# Patient Record
Sex: FEMALE | Race: WHITE | NOT HISPANIC OR LATINO | Employment: FULL TIME | URBAN - METROPOLITAN AREA
[De-identification: names, ages, dates, MRNs, and addresses within clinical notes are randomized per-mention and may not be internally consistent; named-entity substitution may affect disease eponyms.]

---

## 2017-04-03 ENCOUNTER — VIRTUAL VISIT (OUTPATIENT)
Dept: FAMILY MEDICINE | Facility: OTHER | Age: 32
End: 2017-04-03

## 2017-04-03 NOTE — PROGRESS NOTES
"Date:   Clinician: Reza Avery  Clinician NPI: [id number]  Patient: Chhaya Jett  Patient : 1985  Patient Address: [address] [place] [address], [place] [address]  Patient Phone: [phone]  Visit Protocol: UTI  Patient Summary:  Chhaya is a 31 year old ( : 1985 ) female who initiated a Zip for a presumed bladder infection. When asked the question \"Do you have a [facility] primary care physician?\", Chhaya responded \"No\".    Her symptoms began yesterday and consist of urgency, dysuria, and urinary frequency.   Symptom Details   Urinary Frequency: Several times each hour    She denies nausea, loss of appetite, chills, fever, urinary incontinence, hesitation, vaginal discharge, abdominal pain, vomiting, recent antibiotic use, flank pain, hematuria, and foul smelling urine. Chhaya has never had kidney stones. She has not been hospitalized, been a patient in a nursing home, or had a catheter in the past two weeks. She denies risk factors for sexually transmitted infections.   Chhaya has had one (1) UTI in the past 12 months. Her most recent bladder infection was not within the last 4 weeks. Her current symptoms are similar to the previous UTI symptoms. She took ciprofloxacin for her last infection and found it to be effective.   Chhaya does not get yeast infections when she takes antibiotics.   She states she is not pregnant and denies breastfeeding. She no longer menstruates.   She does NOT smoke or use smokeless tobacco.  MEDICATIONS:  No current medications , ALLERGIES:  NKDA   Clinician Response:  Dear Chhaya,  Based on the information you have provided, you likely have a bladder infection, also called an acute urinary tract infection (UTI).   To treat your infection, I am prescribing:   Nitrofurantoin (Macrobid). Swallow one (1) tablet twice a day for 5 days. Take the tablet with food. Continue taking the tablets even if you feel better before all the medication " is gone. There is no refill with this prescription.   Antibiotic selections by the clinician are based on safety and effectiveness. You may or may not be prescribed the same medication that you took for your last bladder infection.   Some people develop allergies to antibiotics. If you notice a new rash, significant swelling, or difficulty breathing, stop the medication immediately and go into a clinic for physical evaluation.   To help treat your current UTI and prevent future occurrences, remember to:     Drink 8-10, 8-ounce glasses of water daily.    Urinate after sexual intercourse.    Wipe front to back after using the bathroom.     Some women may develop a yeast infection as a side effect of taking antibiotics. If you notice symptoms of a yeast infection, Zipnosis can help treat that condition as well. Simply log in and complete another Zip, which will cover all of the necessary questions to determine the best treatment for you.   You should visit a clinic for a follow-up visit if your symptoms do not improve in 1-2 days or if you experience another urinary tract infection soon after completing this treatment.  If you become pregnant during this course of treatment, stop taking the medication and contact your primary care clinician.   Diagnosis: Acute Uncomplicated Bladder Infection  Diagnosis ICD: N39.0  Prescription: nitrofurantoin (Macrobid) 100mg oral tablet 10 tablets, 5 days supply. Take one tablet by mouth two times a day for 5 days. Refills: 0, Refill as needed: no, Allow substitutions: yes  Prescription Sent At: April 03 18:56:22, 2017  Pharmacy: [id number] - [phone] - [address] [place] [address]

## 2018-02-17 ENCOUNTER — VIRTUAL VISIT (OUTPATIENT)
Dept: FAMILY MEDICINE | Facility: OTHER | Age: 33
End: 2018-02-17

## 2018-02-17 NOTE — PROGRESS NOTES
"Date:   Clinician: Nazia Arias  Clinician NPI: [id number]  Patient: Chhaya Jett  Patient : 1985  Patient Address: [address] [place] [address], [place] [address]  Patient Phone: [phone]  Visit Protocol: Oral mouth sores  Patient Summary:  Chhaya is a 32 year old ( : 1985 ) female who initiated a Visit for evaluation of oral mouth sores, specifically cold sores. When asked the question \"Please sign me up to receive news, health information and promotions. \", Chhaya responded \"No\".    Chhaya uploaded images of her condition.   She currently has lip sores. The current sores have been present since yesterday. She notes blisters and redness. The sores are recurrent and not spreading to other parts of the body. The sores are unilateral and are not grouped in a cluster of lesions. Her pain preceded the appearance of the sores. She has had 0 occurrences in the past three months.   Chhaya last had an outbreak over 2 months ago and has tried Valacyclovir (Valtrex) to treat the sores in the past. Valacyclovir (Valtrex) was effective in treating the sores.   She denies: pus, warmth around the affected skin area, exposure to sexually transmitted infection(s), feeling feverish, that the rash is spreading or having similar rash on other body parts. The rash is not located near the eyes.   The patient does not smoke or use smokeless tobacco.   She denies pregnancy and denies breastfeeding. She does not menstruate.   MEDICATIONS:  No current medications  , ALLERGIES:  NKDA   Clinician Response:  Dear Chhaya,  Based on the information provided, the lesions or sores you have are most likely cold sores, also known as fever blisters.  Unless you are allergic to the over-the-counter medication(s) below, I recommend using:   Abreva (docosanol topical). Apply up to five (5) times a day to help with your symptoms until the sores are healed. Start using this at first sign of " symptoms. Use for a maximum of 10 days. This is an over-the-counter medication that does not require a prescription.   I am prescribing:   Valacyclovir (Valtrex) 1gm tablets. Take two (2) tablets twice a day until the tablets are all gone. This medication comes with one refill. If you think your oral sores are returning, refill the medication and use it according to the instructions.   The technical term is 'herpes simplex virus type 1'. Common symptoms include a tingling sensation on the mouth or lips, fluid filled blisters, or crusting on the skin.   A person who has this condition can transmit the infection to others through direct contact. When you have active sores on your face, avoid direct contact such as kissing and do not share silverware, cups, or towels. After you have had cold sores, the virus remains dormant in your skin cells and can come back at a later time causing another cold sore.  To avoid spreading your sores to other people, or to other parts of your body, try not to itch or scratch the sores.  Scratching or itching the sores may also cause the development of a secondary infection. Be sure to wash your hands with soap and water after touching the sores.  If you become pregnant during this course of treatment with medication, stop taking the medication and contact your primary care provider.   If your sores do not heal within 2 weeks, please see your healthcare provider  for further evaluation.   Diagnosis: Herpes Simplex (Cold Sores)  Diagnosis ICD: B00.9  Prescription: valacyclovir (Valtrex) 1gm oral tablet 4 tablets, 1 days supply. Take two tablets by mouth every 12 hours until gone. Refills: 1, Refill as needed: no, Allow substitutions: yes  Pharmacy: [facility]/pharmacy #[id number] - [phone] - [address] [place] [address]

## 2021-07-09 ENCOUNTER — COMMUNICATION - HEALTHEAST (OUTPATIENT)
Dept: SCHEDULING | Facility: CLINIC | Age: 36
End: 2021-07-09

## 2021-07-22 ENCOUNTER — DOCUMENTATION ONLY (OUTPATIENT)
Dept: ADMINISTRATIVE | Facility: OTHER | Age: 36
End: 2021-07-22

## 2021-07-22 NOTE — PROGRESS NOTES
This encounter was created as part of manual pregnancy episode data conversion for the single EHR project. The following information (where applicable) was manually abstracted from the [email or web address] Epic instance on July 22, 2021: pregnancy episode name/date, dating, episode encounter linking, pregravid weight, number of fetuses, and pregnancy overview and plan.     Ana Patton RN   Clinical Informatics

## 2023-11-15 LAB
ABO (EXTERNAL): NORMAL
HEMOGLOBIN (EXTERNAL): 12.9 G/DL (ref 11.1–15.9)
HEPATITIS B SURFACE ANTIGEN (EXTERNAL): NONREACTIVE
HIV1+2 AB SERPL QL IA: NONREACTIVE
PLATELET COUNT (EXTERNAL): 274 10E3/UL (ref 150–450)
RH (EXTERNAL): NEGATIVE
RUBELLA ANTIBODY IGG (EXTERNAL): NORMAL
TREPONEMA PALLIDUM ANTIBODY (EXTERNAL): NONREACTIVE

## 2024-04-02 ENCOUNTER — TRANSFERRED RECORDS (OUTPATIENT)
Dept: HEALTH INFORMATION MANAGEMENT | Facility: CLINIC | Age: 39
End: 2024-04-02

## 2024-05-31 ENCOUNTER — LAB REQUISITION (OUTPATIENT)
Dept: LAB | Facility: CLINIC | Age: 39
End: 2024-05-31
Payer: COMMERCIAL

## 2024-05-31 DIAGNOSIS — Z36.85 ENCOUNTER FOR ANTENATAL SCREENING FOR STREPTOCOCCUS B: ICD-10-CM

## 2024-05-31 PROCEDURE — 87653 STREP B DNA AMP PROBE: CPT | Mod: ORL | Performed by: OBSTETRICS & GYNECOLOGY

## 2024-06-01 LAB — GP B STREP DNA SPEC QL NAA+PROBE: NEGATIVE

## 2024-06-24 ENCOUNTER — ANESTHESIA EVENT (OUTPATIENT)
Dept: OBGYN | Facility: HOSPITAL | Age: 39
End: 2024-06-24
Payer: COMMERCIAL

## 2024-06-24 ENCOUNTER — ANESTHESIA (OUTPATIENT)
Dept: OBGYN | Facility: HOSPITAL | Age: 39
End: 2024-06-24
Payer: COMMERCIAL

## 2024-06-24 ENCOUNTER — HOSPITAL ENCOUNTER (INPATIENT)
Facility: HOSPITAL | Age: 39
LOS: 2 days | Discharge: HOME | End: 2024-06-26
Attending: OBSTETRICS & GYNECOLOGY | Admitting: OBSTETRICS & GYNECOLOGY
Payer: COMMERCIAL

## 2024-06-24 DIAGNOSIS — O34.219 VBAC, DELIVERED: Primary | ICD-10-CM

## 2024-06-24 PROBLEM — Z34.90 PREGNANCY: Status: ACTIVE | Noted: 2024-06-24

## 2024-06-24 PROBLEM — Z36.89 ENCOUNTER FOR TRIAGE IN PREGNANT PATIENT: Status: ACTIVE | Noted: 2024-06-24

## 2024-06-24 LAB
ABO/RH(D): NORMAL
ABO/RH(D): NORMAL
ANTIBODY SCREEN, TUBE: NORMAL
FETAL BLEED SCREEN: NORMAL
HGB BLD-MCNC: 11.2 G/DL (ref 11.7–15.7)
SPECIMEN EXPIRATION DATE: NORMAL

## 2024-06-24 PROCEDURE — 0KQM0ZZ REPAIR PERINEUM MUSCLE, OPEN APPROACH: ICD-10-PCS | Performed by: OBSTETRICS & GYNECOLOGY

## 2024-06-24 PROCEDURE — 00HU33Z INSERTION OF INFUSION DEVICE INTO SPINAL CANAL, PERCUTANEOUS APPROACH: ICD-10-PCS | Performed by: ANESTHESIOLOGY

## 2024-06-24 PROCEDURE — 250N000009 HC RX 250: Performed by: OBSTETRICS & GYNECOLOGY

## 2024-06-24 PROCEDURE — 86850 RBC ANTIBODY SCREEN: CPT | Performed by: OBSTETRICS & GYNECOLOGY

## 2024-06-24 PROCEDURE — 722N000001 HC LABOR CARE VAGINAL DELIVERY SINGLE

## 2024-06-24 PROCEDURE — 85461 HEMOGLOBIN FETAL: CPT | Performed by: OBSTETRICS & GYNECOLOGY

## 2024-06-24 PROCEDURE — 36415 COLL VENOUS BLD VENIPUNCTURE: CPT | Performed by: OBSTETRICS & GYNECOLOGY

## 2024-06-24 PROCEDURE — 250N000011 HC RX IP 250 OP 636: Performed by: ANESTHESIOLOGY

## 2024-06-24 PROCEDURE — 85018 HEMOGLOBIN: CPT | Performed by: OBSTETRICS & GYNECOLOGY

## 2024-06-24 PROCEDURE — 3E0R3BZ INTRODUCTION OF ANESTHETIC AGENT INTO SPINAL CANAL, PERCUTANEOUS APPROACH: ICD-10-PCS | Performed by: ANESTHESIOLOGY

## 2024-06-24 PROCEDURE — 250N000013 HC RX MED GY IP 250 OP 250 PS 637: Performed by: OBSTETRICS & GYNECOLOGY

## 2024-06-24 PROCEDURE — 120N000001 HC R&B MED SURG/OB

## 2024-06-24 PROCEDURE — 86900 BLOOD TYPING SEROLOGIC ABO: CPT | Performed by: OBSTETRICS & GYNECOLOGY

## 2024-06-24 PROCEDURE — 370N000003 HC ANESTHESIA WARD SERVICE: Performed by: ANESTHESIOLOGY

## 2024-06-24 PROCEDURE — 250N000011 HC RX IP 250 OP 636: Mod: JZ | Performed by: OBSTETRICS & GYNECOLOGY

## 2024-06-24 PROCEDURE — 258N000003 HC RX IP 258 OP 636: Mod: JZ | Performed by: OBSTETRICS & GYNECOLOGY

## 2024-06-24 PROCEDURE — 10907ZC DRAINAGE OF AMNIOTIC FLUID, THERAPEUTIC FROM PRODUCTS OF CONCEPTION, VIA NATURAL OR ARTIFICIAL OPENING: ICD-10-PCS | Performed by: OBSTETRICS & GYNECOLOGY

## 2024-06-24 RX ORDER — IBUPROFEN 800 MG/1
800 TABLET, FILM COATED ORAL EVERY 6 HOURS PRN
Status: DISCONTINUED | OUTPATIENT
Start: 2024-06-24 | End: 2024-06-26 | Stop reason: HOSPADM

## 2024-06-24 RX ORDER — IBUPROFEN 800 MG/1
800 TABLET, FILM COATED ORAL
Status: DISCONTINUED | OUTPATIENT
Start: 2024-06-24 | End: 2024-06-26 | Stop reason: HOSPADM

## 2024-06-24 RX ORDER — MISOPROSTOL 200 UG/1
800 TABLET ORAL
Status: DISCONTINUED | OUTPATIENT
Start: 2024-06-24 | End: 2024-06-24 | Stop reason: HOSPADM

## 2024-06-24 RX ORDER — LOPERAMIDE HCL 2 MG
4 CAPSULE ORAL
Status: DISCONTINUED | OUTPATIENT
Start: 2024-06-24 | End: 2024-06-24 | Stop reason: HOSPADM

## 2024-06-24 RX ORDER — PROCHLORPERAZINE MALEATE 10 MG
10 TABLET ORAL EVERY 6 HOURS PRN
Status: DISCONTINUED | OUTPATIENT
Start: 2024-06-24 | End: 2024-06-24 | Stop reason: HOSPADM

## 2024-06-24 RX ORDER — OXYTOCIN 10 [USP'U]/ML
10 INJECTION, SOLUTION INTRAMUSCULAR; INTRAVENOUS
Status: DISCONTINUED | OUTPATIENT
Start: 2024-06-24 | End: 2024-06-24 | Stop reason: HOSPADM

## 2024-06-24 RX ORDER — NALOXONE HYDROCHLORIDE 0.4 MG/ML
0.4 INJECTION, SOLUTION INTRAMUSCULAR; INTRAVENOUS; SUBCUTANEOUS
Status: DISCONTINUED | OUTPATIENT
Start: 2024-06-24 | End: 2024-06-24 | Stop reason: HOSPADM

## 2024-06-24 RX ORDER — MISOPROSTOL 200 UG/1
800 TABLET ORAL
Status: DISCONTINUED | OUTPATIENT
Start: 2024-06-24 | End: 2024-06-26 | Stop reason: HOSPADM

## 2024-06-24 RX ORDER — ACETAMINOPHEN 325 MG/1
650 TABLET ORAL EVERY 4 HOURS PRN
Status: DISCONTINUED | OUTPATIENT
Start: 2024-06-24 | End: 2024-06-24 | Stop reason: HOSPADM

## 2024-06-24 RX ORDER — MODIFIED LANOLIN
OINTMENT (GRAM) TOPICAL
Status: DISCONTINUED | OUTPATIENT
Start: 2024-06-24 | End: 2024-06-26 | Stop reason: HOSPADM

## 2024-06-24 RX ORDER — METOCLOPRAMIDE 10 MG/1
10 TABLET ORAL EVERY 6 HOURS PRN
Status: DISCONTINUED | OUTPATIENT
Start: 2024-06-24 | End: 2024-06-24 | Stop reason: HOSPADM

## 2024-06-24 RX ORDER — PROCHLORPERAZINE 25 MG
25 SUPPOSITORY, RECTAL RECTAL EVERY 12 HOURS PRN
Status: DISCONTINUED | OUTPATIENT
Start: 2024-06-24 | End: 2024-06-24 | Stop reason: HOSPADM

## 2024-06-24 RX ORDER — LIDOCAINE 40 MG/G
CREAM TOPICAL
Status: DISCONTINUED | OUTPATIENT
Start: 2024-06-24 | End: 2024-06-24 | Stop reason: HOSPADM

## 2024-06-24 RX ORDER — OXYTOCIN/0.9 % SODIUM CHLORIDE 30/500 ML
100-340 PLASTIC BAG, INJECTION (ML) INTRAVENOUS CONTINUOUS PRN
Status: DISCONTINUED | OUTPATIENT
Start: 2024-06-24 | End: 2024-06-26 | Stop reason: HOSPADM

## 2024-06-24 RX ORDER — DOCUSATE SODIUM 100 MG/1
100 CAPSULE, LIQUID FILLED ORAL 2 TIMES DAILY
Status: DISCONTINUED | OUTPATIENT
Start: 2024-06-24 | End: 2024-06-26 | Stop reason: HOSPADM

## 2024-06-24 RX ORDER — ONDANSETRON 4 MG/1
4 TABLET, ORALLY DISINTEGRATING ORAL EVERY 6 HOURS PRN
Status: DISCONTINUED | OUTPATIENT
Start: 2024-06-24 | End: 2024-06-24 | Stop reason: HOSPADM

## 2024-06-24 RX ORDER — METHYLERGONOVINE MALEATE 0.2 MG/ML
200 INJECTION INTRAVENOUS
Status: DISCONTINUED | OUTPATIENT
Start: 2024-06-24 | End: 2024-06-26 | Stop reason: HOSPADM

## 2024-06-24 RX ORDER — ONDANSETRON 2 MG/ML
4 INJECTION INTRAMUSCULAR; INTRAVENOUS EVERY 6 HOURS PRN
Status: DISCONTINUED | OUTPATIENT
Start: 2024-06-24 | End: 2024-06-24 | Stop reason: HOSPADM

## 2024-06-24 RX ORDER — FENTANYL CITRATE-0.9 % NACL/PF 10 MCG/ML
100 PLASTIC BAG, INJECTION (ML) INTRAVENOUS EVERY 5 MIN PRN
Status: DISCONTINUED | OUTPATIENT
Start: 2024-06-24 | End: 2024-06-24 | Stop reason: HOSPADM

## 2024-06-24 RX ORDER — OXYTOCIN/0.9 % SODIUM CHLORIDE 30/500 ML
340 PLASTIC BAG, INJECTION (ML) INTRAVENOUS CONTINUOUS PRN
Status: DISCONTINUED | OUTPATIENT
Start: 2024-06-24 | End: 2024-06-24 | Stop reason: HOSPADM

## 2024-06-24 RX ORDER — KETOROLAC TROMETHAMINE 30 MG/ML
30 INJECTION, SOLUTION INTRAMUSCULAR; INTRAVENOUS
Status: DISCONTINUED | OUTPATIENT
Start: 2024-06-24 | End: 2024-06-26 | Stop reason: HOSPADM

## 2024-06-24 RX ORDER — OXYTOCIN 10 [USP'U]/ML
10 INJECTION, SOLUTION INTRAMUSCULAR; INTRAVENOUS
Status: DISCONTINUED | OUTPATIENT
Start: 2024-06-24 | End: 2024-06-26 | Stop reason: HOSPADM

## 2024-06-24 RX ORDER — MISOPROSTOL 200 UG/1
400 TABLET ORAL
Status: DISCONTINUED | OUTPATIENT
Start: 2024-06-24 | End: 2024-06-26 | Stop reason: HOSPADM

## 2024-06-24 RX ORDER — FENTANYL/ROPIVACAINE/NS/PF 2MCG/ML-.1
PLASTIC BAG, INJECTION (ML) EPIDURAL
Status: DISCONTINUED | OUTPATIENT
Start: 2024-06-24 | End: 2024-06-24 | Stop reason: HOSPADM

## 2024-06-24 RX ORDER — TRANEXAMIC ACID 10 MG/ML
1 INJECTION, SOLUTION INTRAVENOUS EVERY 30 MIN PRN
Status: DISCONTINUED | OUTPATIENT
Start: 2024-06-24 | End: 2024-06-24 | Stop reason: HOSPADM

## 2024-06-24 RX ORDER — HYDROCORTISONE 25 MG/G
CREAM TOPICAL 3 TIMES DAILY PRN
Status: DISCONTINUED | OUTPATIENT
Start: 2024-06-24 | End: 2024-06-26 | Stop reason: HOSPADM

## 2024-06-24 RX ORDER — FENTANYL CITRATE 50 UG/ML
50 INJECTION, SOLUTION INTRAMUSCULAR; INTRAVENOUS EVERY 30 MIN PRN
Status: DISCONTINUED | OUTPATIENT
Start: 2024-06-24 | End: 2024-06-24 | Stop reason: HOSPADM

## 2024-06-24 RX ORDER — NALOXONE HYDROCHLORIDE 0.4 MG/ML
0.2 INJECTION, SOLUTION INTRAMUSCULAR; INTRAVENOUS; SUBCUTANEOUS
Status: DISCONTINUED | OUTPATIENT
Start: 2024-06-24 | End: 2024-06-24 | Stop reason: HOSPADM

## 2024-06-24 RX ORDER — CARBOPROST TROMETHAMINE 250 UG/ML
250 INJECTION, SOLUTION INTRAMUSCULAR
Status: DISCONTINUED | OUTPATIENT
Start: 2024-06-24 | End: 2024-06-24 | Stop reason: HOSPADM

## 2024-06-24 RX ORDER — CARBOPROST TROMETHAMINE 250 UG/ML
250 INJECTION, SOLUTION INTRAMUSCULAR
Status: DISCONTINUED | OUTPATIENT
Start: 2024-06-24 | End: 2024-06-26 | Stop reason: HOSPADM

## 2024-06-24 RX ORDER — LOPERAMIDE HCL 2 MG
2 CAPSULE ORAL
Status: DISCONTINUED | OUTPATIENT
Start: 2024-06-24 | End: 2024-06-24 | Stop reason: HOSPADM

## 2024-06-24 RX ORDER — LOPERAMIDE HCL 2 MG
2 CAPSULE ORAL
Status: DISCONTINUED | OUTPATIENT
Start: 2024-06-24 | End: 2024-06-26 | Stop reason: HOSPADM

## 2024-06-24 RX ORDER — MISOPROSTOL 200 UG/1
400 TABLET ORAL
Status: DISCONTINUED | OUTPATIENT
Start: 2024-06-24 | End: 2024-06-24 | Stop reason: HOSPADM

## 2024-06-24 RX ORDER — OXYTOCIN/0.9 % SODIUM CHLORIDE 30/500 ML
340 PLASTIC BAG, INJECTION (ML) INTRAVENOUS CONTINUOUS PRN
Status: DISCONTINUED | OUTPATIENT
Start: 2024-06-24 | End: 2024-06-26 | Stop reason: HOSPADM

## 2024-06-24 RX ORDER — LOPERAMIDE HCL 2 MG
4 CAPSULE ORAL
Status: DISCONTINUED | OUTPATIENT
Start: 2024-06-24 | End: 2024-06-26 | Stop reason: HOSPADM

## 2024-06-24 RX ORDER — METOCLOPRAMIDE HYDROCHLORIDE 5 MG/ML
10 INJECTION INTRAMUSCULAR; INTRAVENOUS EVERY 6 HOURS PRN
Status: DISCONTINUED | OUTPATIENT
Start: 2024-06-24 | End: 2024-06-24 | Stop reason: HOSPADM

## 2024-06-24 RX ORDER — METHYLERGONOVINE MALEATE 0.2 MG/ML
200 INJECTION INTRAVENOUS
Status: DISCONTINUED | OUTPATIENT
Start: 2024-06-24 | End: 2024-06-24 | Stop reason: HOSPADM

## 2024-06-24 RX ORDER — NALBUPHINE HYDROCHLORIDE 20 MG/ML
2.5-5 INJECTION, SOLUTION INTRAMUSCULAR; INTRAVENOUS; SUBCUTANEOUS EVERY 6 HOURS PRN
Status: DISCONTINUED | OUTPATIENT
Start: 2024-06-24 | End: 2024-06-26 | Stop reason: HOSPADM

## 2024-06-24 RX ORDER — TRANEXAMIC ACID 10 MG/ML
1 INJECTION, SOLUTION INTRAVENOUS EVERY 30 MIN PRN
Status: DISCONTINUED | OUTPATIENT
Start: 2024-06-24 | End: 2024-06-26 | Stop reason: HOSPADM

## 2024-06-24 RX ORDER — CITRIC ACID/SODIUM CITRATE 334-500MG
30 SOLUTION, ORAL ORAL
Status: DISCONTINUED | OUTPATIENT
Start: 2024-06-24 | End: 2024-06-24 | Stop reason: HOSPADM

## 2024-06-24 RX ORDER — ACETAMINOPHEN 325 MG/1
650 TABLET ORAL EVERY 4 HOURS PRN
Status: DISCONTINUED | OUTPATIENT
Start: 2024-06-24 | End: 2024-06-26 | Stop reason: HOSPADM

## 2024-06-24 RX ORDER — SODIUM CHLORIDE, SODIUM LACTATE, POTASSIUM CHLORIDE, CALCIUM CHLORIDE 600; 310; 30; 20 MG/100ML; MG/100ML; MG/100ML; MG/100ML
INJECTION, SOLUTION INTRAVENOUS CONTINUOUS
Status: DISCONTINUED | OUTPATIENT
Start: 2024-06-24 | End: 2024-06-26 | Stop reason: HOSPADM

## 2024-06-24 RX ADMIN — SODIUM CHLORIDE, POTASSIUM CHLORIDE, SODIUM LACTATE AND CALCIUM CHLORIDE 1000 ML: 600; 310; 30; 20 INJECTION, SOLUTION INTRAVENOUS at 15:59

## 2024-06-24 RX ADMIN — Medication 340 ML/HR: at 19:47

## 2024-06-24 RX ADMIN — SODIUM CHLORIDE, POTASSIUM CHLORIDE, SODIUM LACTATE AND CALCIUM CHLORIDE: 600; 310; 30; 20 INJECTION, SOLUTION INTRAVENOUS at 17:01

## 2024-06-24 RX ADMIN — LIDOCAINE HYDROCHLORIDE 20 ML: 10 INJECTION, SOLUTION EPIDURAL; INFILTRATION; INTRACAUDAL; PERINEURAL at 19:53

## 2024-06-24 RX ADMIN — Medication: at 16:40

## 2024-06-24 RX ADMIN — DOCUSATE SODIUM 100 MG: 100 CAPSULE, LIQUID FILLED ORAL at 23:25

## 2024-06-24 RX ADMIN — HUMAN RHO(D) IMMUNE GLOBULIN 300 MCG: 1500 SOLUTION INTRAMUSCULAR; INTRAVENOUS at 23:20

## 2024-06-24 ASSESSMENT — ACTIVITIES OF DAILY LIVING (ADL)
ADLS_ACUITY_SCORE: 18
ADLS_ACUITY_SCORE: 35
ADLS_ACUITY_SCORE: 18
WEAR_GLASSES_OR_BLIND: NO
ADLS_ACUITY_SCORE: 18
ADLS_ACUITY_SCORE: 18

## 2024-06-24 NOTE — ANESTHESIA PREPROCEDURE EVALUATION
"Anesthesia Pre-Procedure Evaluation    Patient: Chhaya Jett   MRN: [id number] : 1985        Procedure :           History reviewed. No pertinent past medical history.   Past Surgical History:   Procedure Laterality Date     SECTION      wisdom teeth        No Known Allergies   Social History     Tobacco Use    Smoking status: Never    Smokeless tobacco: Never   Substance Use Topics    Alcohol use: Not Currently      Wt Readings from Last 1 Encounters:   24 82.6 kg (182 lb)        Anesthesia Evaluation   Pt has had prior anesthetic.         ROS/MED HX  ENT/Pulmonary:  - neg pulmonary ROS     Neurologic:  - neg neurologic ROS     Cardiovascular:  - neg cardiovascular ROS     METS/Exercise Tolerance:     Hematologic:  - neg hematologic  ROS     Musculoskeletal:  - neg musculoskeletal ROS     GI/Hepatic:  - neg GI/hepatic ROS     Renal/Genitourinary:  - neg Renal ROS     Endo:     (+)               Obesity,       Psychiatric/Substance Use:       Infectious Disease:  - neg infectious disease ROS     Malignancy:  - neg malignancy ROS     Other:      (+) Possibly pregnant, , ,         Physical Exam    Airway  airway exam normal           Respiratory Devices and Support         Dental       (+) Completely normal teeth      Cardiovascular   cardiovascular exam normal       Rhythm and rate: regular and normal     Pulmonary   pulmonary exam normal        breath sounds clear to auscultation           OUTSIDE LABS:  CBC:   Lab Results   Component Value Date    HGB 11.2 (L) 2024     BMP: No results found for: \"NA\", \"POTASSIUM\", \"CHLORIDE\", \"CO2\", \"BUN\", \"CR\", \"GLC\"  COAGS: No results found for: \"PTT\", \"INR\", \"FIBR\"  POC: No results found for: \"BGM\", \"HCG\", \"HCGS\"  HEPATIC: No results found for: \"ALBUMIN\", \"PROTTOTAL\", \"ALT\", \"AST\", \"GGT\", \"ALKPHOS\", \"BILITOTAL\", \"BILIDIRECT\", \"KENZIE\"  OTHER: No results found for: \"PH\", \"LACT\", \"A1C\", \"GALILEO\", \"PHOS\", \"MAG\", \"LIPASE\", \"AMYLASE\", \"TSH\", \"T4\", \"T3\", " "\"CRP\", \"SED\"    Anesthesia Plan    ASA Status:  2       Anesthesia Type: Epidural.              Consents    Anesthesia Plan(s) and associated risks, benefits, and realistic alternatives discussed. Questions answered and patient/representative(s) expressed understanding.     - Discussed: Risks, Benefits and Alternatives for BOTH SEDATION and the PROCEDURE were discussed     - Discussed with:  Patient, Spouse      - Extended Intubation/Ventilatory Support Discussed: No.           Postoperative Care    Pain management: IV analgesics.   PONV prophylaxis: Ondansetron (or other 5HT-3)     Comments:               Alejandro Aguirre MD    I have reviewed the pertinent notes and labs in the chart from the past 30 days and (re)examined the patient.  Any updates or changes from those notes are reflected in this note.                  "

## 2024-06-24 NOTE — PROGRESS NOTES
Was called by patients nurse to perform ultrasound to confirm fetal position.   I performed bedside ultrasound. The fetus is in vertex position.     Cale Kim MD

## 2024-06-24 NOTE — ANESTHESIA PROCEDURE NOTES
"Epidural catheter Procedure Note    Pre-Procedure   Staff -        Anesthesiologist:  Alejandro Aguirre MD       Performed By: anesthesiologist       Location: OB       Procedure Start/Stop Times: 6/24/2024 4:31 PM and 6/24/2024 4:39 PM       Pre-Anesthestic Checklist: patient identified, IV checked, risks and benefits discussed, informed consent, monitors and equipment checked, pre-op evaluation and at physician/surgeon's request  Timeout:       Correct Patient: Yes        Correct Procedure: Yes        Correct Site: Yes        Correct Position: Yes   Procedure Documentation  Procedure: epidural catheter       Patient Position: sitting       Patient Prep/Sterile Barriers: sterile gloves, mask, patient draped       Skin prep: Chloraprep       Local skin infiltrated with mL of 1% lidocaine.        Insertion Site: L1-2. (midline approach).       Technique: LORT air        Needle Type: MobileSuiteslibra       Needle Gauge: 18.        Needle Length (Inches): 3.5        Catheter: 20 G.          Catheter threaded easily.         7 cm epidural space.         Threaded 12 cm at skin.         # of attempts: 1 and  # of redirects:  0    Assessment/Narrative         Test dose of mL lidocaine 1.5% w/ 1:200,000 epinephrine at 16:47 CDT.         Test dose negative, 3 minutes after injection, for signs of intravascular, subdural, or intrathecal injection.       Insertion/Infusion Method: LORT air       Aspiration negative for Heme or CSF via Epidural Catheter.    Medication(s) Administered   Medication Administration Time: 6/24/2024 4:31 PM      FOR [facility] ONLY:   Pain Team Contact information: please page the Pain Team Via [employer]. Search \"Pain\". During daytime hours, please page the attending first. At night please page the resident first.      "

## 2024-06-24 NOTE — PROGRESS NOTES
Data: Patient presented to [facility]: 2024 10:30 AM.  Reason for maternal/fetal assessment is uterine contractions. Patient reports irregular uterine contractions since last evening. Patient denies leaking of vaginal fluid/rupture of membranes, vaginal bleeding, nausea, vomiting, headache, visual disturbances, epigastric or RUQ pain, significant edema. Patient reports fetal movement is normal. Patient is a 40w0d . Prenatal record reviewed. Pregnancy has been . Support persons are present and supportive.     Fetal HR baseline was 120, variability is moderate (amplitude range 6 to 25 bpm). Accelerations: increase 15 bpm above baseline lasting 15 seconds. Decelerations: absent. Uterine assessment is   during contractions and   at rest. Cervix 4 cm dilated and 80% effaced. Fetal station -2. Fetal presentation cephalic (by bedside u/s) per Leopolds, cervical exam, and bedside ultrasound . Membranes: intact.    Vital signs wnl. Patient reports  tightening in lower pelvis and occasional cramping  and is coping well at this time and often talking through contractions, reporting the intensity has changed to less than she was experiencing at home.    Action: Verbal consent for EFM. Triage assessment completed. Patient does not meet criteria for early labor discharge. Reviewed prenatal record. Physical assessment completed and vital signs obtained.    Response: Patient verbalized agreement with plan. Dr Preston updated regarding cat I EFM tracing, uterine activity, SVE, TOLAC history and birth preference for free movement, minimal vaginal exams, calm environment and low intervention at this time. Plan to update provider in another hour.

## 2024-06-24 NOTE — PROGRESS NOTES
Comfortable following labor epidural-unaware of contractions and talking through them at this time. SVE as noted at 1657. Straight cath performed at 1713 for 125 ml pale yellow clear urine. Marysol had last voided just prior to epidural placement. Reviewed falls risk and band applied. Encouraged rest as needed. Dr Preston updated regarding cat I EFM tracing, uterine contraction pattern, SVE and epidural placement completed. Discussed options for AROM augmentation later this evening.

## 2024-06-24 NOTE — PROGRESS NOTES
"Comfortable following epidural placement.     Baby currently is Cat 1, one earlier deceleration.     O:  /55 (BP Location: Right arm, Patient Position: Left side, Cuff Size: Adult Regular)   Temp 98.6  F (37  C) (Oral)   Resp 18   Ht 1.676 m (5' 6\")   Wt 82.6 kg (182 lb)   LMP 2023   SpO2 100%   Breastfeeding No   BMI 29.38 kg/m       7-8cm/BBOW    40yo  @ 40wks.  GBS neg.    Expect .    TOLAC.        Ellen Preston DO, FACOG  2024 5:51 PM     "

## 2024-06-24 NOTE — PROGRESS NOTES
Feeling more pelvic pressure since arrival. Still reports feeling relaxed and able to talk through contraction. Reviewed positions to encourage fetal engagement and create space. Dr Preston at bedside and reviewed EFM tracing/contraction pattern and discussed options for augmentation if later needed and Marysol is interested. Intrapartum orders received and labs drawn. Plan with provider and patient to allow regular diet now and change to clear liquids once actively laboring. Will place IV access as labor progresses. Marysol feels comfortable with plan of care at this time.

## 2024-06-24 NOTE — PROGRESS NOTES
Marysol reports she is coping well with contractions. Frequent position changes in labor and hydrating orally with water at bedside. Feeling tired after lunch and encouraged rest as needed. Assisted to flying cowgirl position on left side in bed with peanut ball, decreased environmental stimuli. Irregular uterine contractions every 2-4 minutes that vary in intensity between mild and moderate.

## 2024-06-24 NOTE — H&P
"OB HISTORY AND PHYSICAL UPDATE ADMISSION EXAM--- LATE ENTRY    Chhaya Jett  1985  [id number]    HPI: 38yo  @ 40wks.  First baby was PLTCS at 26wks sec to abruption.     Uncomplicated preg.  Seeirene Nieves.      Estimated Date of Delivery: 2024                       EGA 40w0d    OB History    Para Term  AB Living   2 1 0 1 0 1   SAB IAB Ectopic Multiple Live Births   0 0 0 0 1      # Outcome Date GA Lbr Kirk/2nd Weight Sex Type Anes PTL Lv   2 Current            1  21 26w4d   F CS-LTranv  Y GLORIA      Complications: Abruptio Placenta     History reviewed. No pertinent past medical history.  Past Surgical History:   Procedure Laterality Date     SECTION      wisdom teeth         Exam:    /55 (BP Location: Right arm, Patient Position: Left side, Cuff Size: Adult Regular)   Temp 98.6  F (37  C) (Oral)   Resp 18   Ht 1.676 m (5' 6\")   Wt 82.6 kg (182 lb)   LMP 2023   SpO2 100%   Breastfeeding No   BMI 29.38 kg/m          HEENT          WNL              Heart              WNL               Lungs             WNL                      Abdomen        WNL                       Extremities     WNL                     Vaginal exam 4cm      Fetal Status  Cat 1    Assessment: Labor.  Prior c/s.      Plan: Spontaneous labor, anticipate vaginal delivery    Ellen Preston DO, FACOG  2024 5:52 PM     "

## 2024-06-25 LAB — HGB BLD-MCNC: 10.5 G/DL (ref 11.7–15.7)

## 2024-06-25 PROCEDURE — 85018 HEMOGLOBIN: CPT | Performed by: OBSTETRICS & GYNECOLOGY

## 2024-06-25 PROCEDURE — 250N000013 HC RX MED GY IP 250 OP 250 PS 637: Performed by: OBSTETRICS & GYNECOLOGY

## 2024-06-25 PROCEDURE — 120N000001 HC R&B MED SURG/OB

## 2024-06-25 PROCEDURE — 36415 COLL VENOUS BLD VENIPUNCTURE: CPT | Performed by: OBSTETRICS & GYNECOLOGY

## 2024-06-25 RX ADMIN — ACETAMINOPHEN 650 MG: 325 TABLET ORAL at 16:02

## 2024-06-25 RX ADMIN — IBUPROFEN 800 MG: 800 TABLET ORAL at 08:00

## 2024-06-25 RX ADMIN — DOCUSATE SODIUM 100 MG: 100 CAPSULE, LIQUID FILLED ORAL at 20:07

## 2024-06-25 RX ADMIN — WITCH HAZEL: 500 SOLUTION RECTAL; TOPICAL at 20:23

## 2024-06-25 RX ADMIN — BENZOCAINE AND LEVOMENTHOL: 200; 5 SPRAY TOPICAL at 03:50

## 2024-06-25 RX ADMIN — IBUPROFEN 800 MG: 800 TABLET ORAL at 20:06

## 2024-06-25 RX ADMIN — IBUPROFEN 800 MG: 800 TABLET ORAL at 14:15

## 2024-06-25 RX ADMIN — DOCUSATE SODIUM 100 MG: 100 CAPSULE, LIQUID FILLED ORAL at 08:00

## 2024-06-25 RX ADMIN — ACETAMINOPHEN 650 MG: 325 TABLET ORAL at 20:07

## 2024-06-25 ASSESSMENT — ACTIVITIES OF DAILY LIVING (ADL)
ADLS_ACUITY_SCORE: 18

## 2024-06-25 NOTE — LACTATION NOTE
This note was copied from a baby's chart.  Initial Lactation Visit    Hours since Delivery: 14 hours old    Gestational Age at Delivery: 40w0d     Diaper Count: 0 wet 1 soiled     Feedings: 5 breast 0 bottle      Past breastfeeding experience:First child was born at 26GA in the NICU for 6months. She used a nipple shield and worked closely with lactation.     Maternal health risk that may affect breastfeeding:AMA    Breast Assessment:rounded, symmetrical. Nipples tiara, intact    Feeding Assessment: Mother had infant in cross cradle hold on left breast, swallows observed 4:1. Reviewed tilting head, chin into breast to lift off of infant's chest. Mother plans on getting breastpump from her clinic.     Feeding Plan:Breastfeeding Plan:     Offer breast every 2-3 hours.   Massage breast to encourage milk flow   Strive for a deep and comfortable latch  Positioning reminders:  line up baby's nose to nipple   baby's chin touching the breast below the areola  ear, shoulder, hip, nice straight line   chin off chest  your thumb lined up like baby's mustache, fingers under breast like a baby's beard  cheeks touching breast  Switch sides when swallows slow, baby pauses lengthen and compressions do not help    Education:   [x] Expected  feeding patterns in the first few days (pg. 38 of Your Guide to To Postpartum and Natalbany Care)/ the Second Night  [] Stages of milk production  [x] Benefits of hand expression of colostrum  [x] Early feeding cues     [x] Benefits of skin to skin  [x] Breastfeeding positions  [x] Tips to get and maintain a deep latch  [x] Nutritive vs.non-nutritive sucking  [x] Gentle breast compressions as needed to enhance milk transfer  [x] How to tell when baby is finished  [x] Expected  output  [] [place] weight loss  [] Infant Feeding Log  [] Signs breastfeeding is going well (comfortable latch, audible swallows, age appropriate output and weight loss)    [] Engorgement  [] Reverse Pressure  Softening  [] Pumping recommendations (based on patient need)  [] Inpatient breastfeeding support  [] Outpatient lactation resources    Follow up: Will follow up tomorrow, 6/26, for discharge education

## 2024-06-25 NOTE — L&D DELIVERY NOTE
VAGINAL DELIVERY NOTE    PRE DELIVERY DIAGNOSIS  1) Intrauterine pregnancy at 40w0d   2) Prior  section  3) Meconium, thick  4) AMA      POST DELIVERY DIAGNOSIS  1) Intrauterine pregnancy at 40w0d   2) Delivery of a living male.  3) Weight:pending  4) Postpartum hemorrhage: No  5) 2nd Degree Laceration    Delivery Method/Type:       PROVIDER:   Ellen Preston DO, FACOG     ANESTHESIA:  Epidural    LACERATION: second degree    QBL: 150cc    COMPLICATIONS: None apparent    DESCRIPTION OF PROCEDURE  Chhaya Jett is a 39 year old  with prenatal care with Ezequiel who was admitted at 4cm for labor.  Patient had a .  Delayed cord clamping performed.  No gross fetal defects were observed. Pitocin was administered. Placenta delivered intact with 3 vessel cord. The perineum was then evaluated and noted to have a 2nd degree laceration that was repaired in the usual manner with 3-0 Vicryl.     Ellen Preston DO, FACOG          Jose Jett-Chhaya [[id number]]      Labor Event Times      Latent labor onset date/time: 2024 1430    Active labor onset date: 24 Onset time:  4:57 PM   Dilation complete date: 24 Complete time:  7:12 PM   Start pushing date/time: 2024 1915          Labor Events     labor?: No   steroids: None  Labor Type: Spontaneous, AROM  Predominate monitoring during 1st stage: continuous electronic fetal monitoring     Antibiotics received during labor?: No     Rupture identifier: Sac 1  Rupture date/time: 24 1841   Rupture type: Artificial Rupture of Membranes  Fluid color: Meconium  Fluid odor: Normal     Augmentation: AROM       Delivery/Placenta Date and Time      Delivery Date: 24 Delivery Time:  7:47 PM   Placenta Date/Time: 2024  7:59 PM  Delivering clinician: Ellen Preston MD   Other personnel present at delivery:  Provider Role   Stacy Mackey, RN Registered Nurse   Aminata Florez RN  Registered Nurse             Vaginal Counts       Initial count performed by 2 team members:  Two Team Members   Kary Mackey         Needles Suture Needles Sponges (RETIRED) Instruments   Initial counts 0 0 5    Added to count 1 2 0    Relief counts       Final counts 1 2 5            Placed during labor Accounted for at the end of labor   FSE Yes    IUPC NA    Cervidil NA NA                  Final count performed by 2 team members:  Two Team Members   Kary Mackey             Apgars    Living status: Living   1 Minute 5 Minute 10 Minute 15 Minute 20 Minute   Skin color: 0  1       Heart rate: 2  2       Reflex irritability: 2  2       Muscle tone: 2  2       Respiratory effort: 2  2       Total: 8  9       Apgars assigned by: DUSITN WOOD RN       Cord      Vessels: 3 Vessels    Cord Complications: Nuchal   Nuchal Intervention: reduced         Nuchal cord description: loose nuchal cord         Cord Blood Disposition: Lab    Gases Sent?: No    Delayed cord clamping?: Yes    Cord Clamping Delay (seconds): 31-60 seconds,  seconds    Stem cell collection?: No           [place] Resuscitation    Methods: None       Labor Events and Shoulder Dystocia    Fetal Tracing Prior to Delivery: Category 2, Category 1  Shoulder dystocia present?: Neg       Delivery (Maternal) (Provider to Complete) ([id number])    Episiotomy: None  Perineal lacerations: 2nd Repaired?: Yes   Est. blood loss (mL): 150  Repair suture: 3-0 Vicryl  Number of repair packets: 1  Genital tract inspection done: Pos       Blood Loss  Mother: Chhaya Jett #[id number]     Start of Mother's Information      Delivery Blood Loss  24 1657 - 24 2054      EBL (mL) Hospital Encounter 150 mL    Total  150 mL               End of Mother's Information  Mother: JohnieChhaya #[id number]                Delivery - Provider to Complete ([id number])    Delivering clinician: Ellen Preston MD  Delivery Type  (Choose the 1 that will go to the Birth History): , Spontaneous                         Other personnel:  Provider Role   Stacy Mackey, RN Registered Nurse   Aminata Florez RN Registered Nurse                    Placenta    Date/Time: 2024  7:59 PM  Removal: Spontaneous  Disposition: Hospital disposal             Anesthesia    Method: Epidural  Cervical dilation at placement: 4-7                    Presentation and Position    Presentation: Vertex

## 2024-06-25 NOTE — ANESTHESIA POSTPROCEDURE EVALUATION
Patient: Chhaya Jett    Procedure: * No procedures listed *       Anesthesia Type:  Epidural    Note:  Disposition: Inpatient   Postop Pain Control: Uneventful            Sign Out: Well controlled pain   PONV: No   Neuro/Psych: Uneventful            Sign Out: Acceptable/Baseline neuro status   Airway/Respiratory: Uneventful            Sign Out: Acceptable/Baseline resp. status   CV/Hemodynamics: Uneventful            Sign Out: Acceptable CV status; No obvious hypovolemia; No obvious fluid overload   Other NRE:    DID A NON-ROUTINE EVENT OCCUR?        Last vitals:  Vitals:    06/25/24 0340 06/25/24 0756 06/25/24 1200   BP: 105/65 128/78 128/76   Pulse:  76    Resp: 16 18 16   Temp: 36.9  C (98.5  F) 36.7  C (98.1  F) 36.4  C (97.5  F)   SpO2: 97% 98% 99%       Electronically Signed By: Andrew Foley MD  June 25, 2024  3:23 PM

## 2024-06-25 NOTE — PLAN OF CARE
Goal Outcome Evaluation:       BABY PCP AFTER DISCHARGE IS DR. NOEL BONNER @ [facility]. EM STEPHAN [facility] @1945 6/24/24

## 2024-06-25 NOTE — PROGRESS NOTES
of viable infant male at . Infant placed skin to skin with mother, spontaneous cry and breathing noted, delayed cord clamping completed. Cord cut and clamped, cord blood obtained due to maternal Rh negative status, cord segment collected per protocol. Placenta delivered spontaneously and intact at , bleeding well controlled, IV pitocin infusing per protocol and provider orders. 2nd degree laceration noted and repaired. Total QBL: 150, counts completed and correct. RN to continue closely monitoring and complete recovery cares.     Stacy Mackey RN

## 2024-06-25 NOTE — PROGRESS NOTES
Recovery cares completed, VSS, afebrile. Patient continues to report minimal pain and denies need for medication at this time. Pt successfully ambulated to  with ease, able to void 500 ccs, janice care completed independently, janice pad and ice pack applied. Face washed and teeth brushed. Patient overall doing remarkably well, bonding appropriately with infant and verbalizes relief and happiness. Patient transferred to PP room without complaint, able to void a 2nd time for 600 ccs, continues to report minimal pain. IV pitocin completed and IV saline locked. Report given to BALTA Capps RN, pt care relinquished at this time.     Stacy Mackey RN

## 2024-06-25 NOTE — PLAN OF CARE
Vitals and assessment within normal parameters.   Patient is up ambulating well independently and voiding without difficulty. Patient has completed two measured voids per protocol.     Patient denies pain and denies need for pain medication at this time. She is aware what is available for her for pain management. Ice pack and janice bottle helpful, per patient. Tucks and Dermoplast spray also provided.     Patient is bonding well with her . Her significant other is also present in the room and attentive.     Plan for hgb re-check in the am.       Sharda Capps RN      Problem: Adult Inpatient Plan of Care  Goal: Plan of Care Review  Description: The Plan of Care Review/Shift note should be completed every shift.  The Outcome Evaluation is a brief statement about your assessment that the patient is improving, declining, or no change.  This information will be displayed automatically on your shift  note.  Outcome: Progressing

## 2024-06-25 NOTE — PROGRESS NOTES
[facility] Labor and Delivery Progress Note  PPD # 1     Chhaya Jett MRN# [id number]   Age: 39 year old YOB: 1985           Subjective:   Happy, has voided and taking po, baby in room           Objective:   Patient Vitals for the past 24 hrs:   BP Temp Temp src Pulse Resp SpO2 Height Weight BMI (Calculated) Oximeter Heart Rate   24 0756 128/78 98.1  F (36.7  C) Oral 76 18 98 % -- -- -- --   24 0340 105/65 98.5  F (36.9  C) Oral -- 16 97 % -- -- -- 64 bpm   24 0008 127/80 98.1  F (36.7  C) Oral -- 18 98 % -- -- -- 80 bpm   245 135/76 98.5  F (36.9  C) Oral -- 20 -- -- -- -- 73 bpm   245 (!) 143/75 -- -- -- -- -- -- -- -- 87 bpm   24 139/66 -- -- -- -- -- -- -- -- 72 bpm   24 128/57 -- -- -- -- -- -- -- -- 76 bpm   24 123/67 -- -- -- -- -- -- -- -- 82 bpm   24 119/58 -- -- -- -- -- -- -- -- 80 bpm   24 121/55 -- -- -- -- -- -- -- -- 87 bpm   24 136/67 -- -- -- -- -- -- -- -- 77 bpm   24 125/62 98.8  F (37.1  C) Oral -- -- 97 % -- -- -- 86 bpm   24 1904 -- -- -- -- -- 100 % -- -- -- 97 bpm   24 1850 130/66 -- -- -- -- -- -- -- -- 84 bpm   24 -- -- -- -- -- 100 % -- -- -- 82 bpm   24 182 118/64 -- -- -- 18 -- -- -- -- 78 bpm   24 175 -- 98.6  F (37  C) Oral -- 16 100 % -- -- -- 73 bpm   24 1743 -- -- -- -- -- 100 % -- -- -- 70 bpm   24 113/55 -- -- -- 18 -- -- -- -- 71 bpm   24 -- -- -- -- -- 100 % -- -- -- 70 bpm   24 1723 110/55 -- -- -- 16 -- -- -- -- 66 bpm   24 -- -- -- -- -- 100 % -- -- -- 78 bpm   24 171 115/57 -- -- -- -- -- -- -- -- 81 bpm   24 -- -- -- -- -- 100 % -- -- -- 71 bpm   24 171 126/56 -- -- -- -- -- -- -- -- 87 bpm   24 -- -- -- -- -- 100 % -- -- -- 73 bpm   24 1707 113/60 -- -- -- -- -- -- -- -- 98 bpm   24 170 -- -- -- --  "-- 100 % -- -- -- 73 bpm   24 1703 111/65 -- -- -- -- -- -- -- -- 83 bpm   24 1701 -- -- -- -- -- 100 % -- -- -- 78 bpm   24 1657 115/68 -- -- -- 15 -- -- -- -- 83 bpm   24 1656 -- -- -- -- -- 100 % -- -- -- 79 bpm   24 1655 114/62 -- -- -- -- -- -- -- -- --   24 1645 114/65 98.6  F (37  C) Oral -- 16 99 % -- -- -- 75 bpm   24 1643 117/72 -- -- -- -- -- -- -- -- 78 bpm   24 1641 129/65 -- -- -- -- -- -- -- -- 75 bpm   24 1640 -- -- -- -- -- 100 % -- -- -- 78 bpm   24 1639 129/83 -- -- -- -- -- -- -- -- 81 bpm   24 1637 128/78 -- -- -- -- -- -- -- -- 81 bpm   24 1636 134/75 -- -- -- -- -- -- -- -- 86 bpm   24 1635 -- -- -- -- -- 99 % -- -- -- 90 bpm   24 1630 -- -- -- -- -- 100 % -- -- -- 89 bpm   24 1625 -- -- -- -- -- 100 % -- -- -- 80 bpm   24 1500 -- 98.4  F (36.9  C) Oral -- -- -- -- -- -- --   24 1422 120/74 -- -- -- 15 -- -- -- -- 80 bpm   24 1111 -- -- -- -- 16 -- -- -- -- --   24 1110 120/73 98  F (36.7  C) Oral -- -- -- -- -- -- 76 bpm   24 1107 -- -- -- -- -- -- 1.676 m (5' 6\") 82.6 kg (182 lb) 29.38 --       Alert, O x 3  CV regular  Resp non labored  Ext trace edema        Assessment:   Chhaya Jett is a 39 year old  who is PPD # 1 s/p successful , history of prior c/s at 26 weeks for abruption, doing well, 24 hours late pm          Plan:   Continue pp cares, home tomorrow      Radha Cheung MD      "

## 2024-06-25 NOTE — PROGRESS NOTES
Received bedside report from YAHIR Mackey RN. Dual bedside fundal check completed. RN/writer assuming care at this time.       Sharda Capps RN

## 2024-06-25 NOTE — PLAN OF CARE
Problem: Adult Inpatient Plan of Care  Goal: Plan of Care Review  Outcome: Progressing    VSS. Hgb 10.5 today. Fundus firm. Pt reports tolerable pain control with ibuprofen use. Pt declined prn tylenol when offered. Ambulating independently in the room. Breastfeeding with good latch observed. Attentive to infant cues.

## 2024-06-26 VITALS
SYSTOLIC BLOOD PRESSURE: 106 MMHG | TEMPERATURE: 97.8 F | RESPIRATION RATE: 16 BRPM | OXYGEN SATURATION: 99 % | HEART RATE: 70 BPM | WEIGHT: 177.1 LBS | HEIGHT: 66 IN | DIASTOLIC BLOOD PRESSURE: 73 MMHG | BODY MASS INDEX: 28.46 KG/M2

## 2024-06-26 PROCEDURE — 250N000013 HC RX MED GY IP 250 OP 250 PS 637: Performed by: OBSTETRICS & GYNECOLOGY

## 2024-06-26 RX ORDER — ACETAMINOPHEN 325 MG/1
650 TABLET ORAL EVERY 4 HOURS PRN
COMMUNITY
Start: 2024-06-26

## 2024-06-26 RX ORDER — IBUPROFEN 800 MG/1
800 TABLET, FILM COATED ORAL EVERY 6 HOURS PRN
COMMUNITY
Start: 2024-06-26

## 2024-06-26 RX ORDER — DOCUSATE SODIUM 100 MG/1
100 CAPSULE, LIQUID FILLED ORAL 2 TIMES DAILY PRN
COMMUNITY
Start: 2024-06-26

## 2024-06-26 RX ADMIN — BENZOCAINE AND LEVOMENTHOL: 200; 5 SPRAY TOPICAL at 11:08

## 2024-06-26 RX ADMIN — IBUPROFEN 800 MG: 800 TABLET ORAL at 08:45

## 2024-06-26 RX ADMIN — DOCUSATE SODIUM 100 MG: 100 CAPSULE, LIQUID FILLED ORAL at 08:45

## 2024-06-26 RX ADMIN — IBUPROFEN 800 MG: 800 TABLET ORAL at 01:48

## 2024-06-26 RX ADMIN — ACETAMINOPHEN 650 MG: 325 TABLET ORAL at 01:44

## 2024-06-26 ASSESSMENT — ACTIVITIES OF DAILY LIVING (ADL)
ADLS_ACUITY_SCORE: 18

## 2024-06-26 NOTE — LACTATION NOTE
This note was copied from a baby's chart.  Lactation follow-up Note:      Hours since Delivery: 1 day 13 hours old.    Gestational Age at Delivery: 40.0 weeks.    Visit with Lactation: In the last 24 hours, infant has been to breast 9 times, has voided x3, soiled x2, and had a weight loss of 4.11% (since delivery). Mother states Vinnie has been breastfeeding well, and she is wondering if things are going okay d/t this being her first full term infant. Vinnie are breast upon entering room, he was sleepy, and mother states he just ate for 35 minutes; LC slightly repositioned Vinnie to be closer to mother, and not pulling on her nipple as much. Reviewed what a deep latch looks and feels like, and well as nutritive sucking. Engorgement education given, as well as reverse pressure softening. Encouraged mother to let primary RN know if she would like lactation to return for feeding assistance or if questions arise. Mother aware of lactation resources available to her after discharging from hospital.     Plan: Skin-to-skin prior to feedings. Continue breastfeeding on-demand and/or every 2-3 hours. Track feedings and diaper output.    Has Breast Pump for Home: No, she states she will be getting Spectra S1 after discharge.    Education given: Stages of milk production after delivery, How to obtain & maintain a deep, comfortable latch, Listening & watching for swallows, How do I know if my baby is finished & getting enough, Importance of tracking all feedings and diaper output, Engorgement, Reverse pressure softening, Nutritive vs non-nutritive sucking, Burping, Cluster feeding, How to tell if breastfeeding is going well,  waking techniques, and Breast pump use for home.

## 2024-06-26 NOTE — PROGRESS NOTES
Birthplace RN Care Coordinator Note    Chhaya BARBARA Trejoer  [id number]  1985    Chart reviewed, discharge follow-up plan discussed with patient's bedside RN, needs assessed. Post-delivery appointment planned in 4-6 weeks at [facility]. Home care nurse visit not ordered by discharging provider; patient lives outside home care agency service area.     RN Care Coordinator will continue to follow and assist with discharge planning as needed.

## 2024-06-26 NOTE — PLAN OF CARE
Patient is bonding with baby boy, up ad boni, voiding without difficulty.  VSS, bleeding is scant, firm 1 below U. Utilizing tylenol, ibuprofen, cold packs, dermoplast and tucks for perineum soreness. Nipples tender, lanolin at bedside.   Breastfeeding every 2-3 hours, independent with latching and positioning. This RN offered help and assistance with feedings.     PPMA and BC at bedside, yet to complete. Family would like to do a circumcision outpatient.

## 2024-06-26 NOTE — PLAN OF CARE
Problem: Adult Inpatient Plan of Care  Goal: Plan of Care Review  Outcome: Met    VSS. Fundus firm. Pt reports tolerable pain relief with ibuprofen use. Ambulating independently in the room. Breastfeeding with good latch observed. Attentive to infant cues. Discharge instructions and warning signs given to pt and pt verbalized understanding and denied questions at this time. ID bands verified at discharge.

## 2024-06-26 NOTE — DISCHARGE INSTRUCTIONS
Warning Signs after Having a Baby    Keep this paper on your fridge or somewhere else where you can see it.    Call your provider if you have any of these symptoms up to 12 weeks after having your baby.    Thoughts of hurting yourself or your baby  Pain in your chest or trouble breathing  Severe headache not helped by pain medicine  Eyesight concerns (blurry vision, seeing spots or flashes of light, other changes to eyesight)  Fainting, shaking or other signs of a seizure    Call 9-1-1 if you feel that it is an emergency.     The symptoms below can happen to anyone after giving birth. They can be very serious. Call your provider if you have any of these warning signs.    My provider s phone number: _______________________    Losing too much blood (hemorrhage)    Call your provider if you soak through a pad in less than an hour or pass blood clots bigger than a golf ball. These may be signs that you are bleeding too much.    Blood clots in the legs or lungs    After you give birth, your body naturally clots its blood to help prevent blood loss. Sometimes this increased clotting can happen in other areas of the body, like the legs or lungs. This can block your blood flow and be very dangerous.     Call your provider if you:  Have a red, swollen spot on the back of your leg that is warm or painful when you touch it.   Are coughing up blood.     Infection    Call your provider if you have any of these symptoms:  Fever of 100.4 F (38 C) or higher.  Pain or redness around your stitches if you had an incision.   Any yellow, white, or green fluid coming from places where you had stitches or surgery.    Mood Problems (postpartum depression)    Many people feel sad or have mood changes after having a baby. But for some people, these mood swings are worse.     Call your provider right away if you feel so anxious or nervous that you can't care for yourself or your baby.    Preeclampsia (high blood pressure)    Even if you  didn't have high blood pressure when you were pregnant, you are at risk for the high blood pressure disease called preeclampsia. This risk can last up to 12 weeks after giving birth.     Call your provider if you have:   Pain on your right side under your rib cage  Sudden swelling in the hands and face    Remember: You know your body. If something doesn't feel right, get medical help.     For informational purposes only. Not to replace the advice of your health care provider. Copyright 2020 [facility]. All rights reserved. Clinically reviewed by Lacy Roland, RNC-OB, MSN. [employer] 559243 - Rev 02/23.

## 2024-06-26 NOTE — DISCHARGE SUMMARY
"CONDITIONS COMPLICATION ANTEPARTUM/POSTPARTUM:  Refer to prenatal   History of prior  at 26 weeks due to placental abruption, child is alive and well      HISTORY OF PRESENT ILLNESS AND HOSPITAL COURSE: This is a 39 year old,  female who presented in active labor and underwent  successful  . Post partum course was unremarkable. On the day that she was discharged, vital signs were stable. Her pain was controlled, she was tolerating diet. She was voiding and passing gas.     LABS:  Lab Results   Component Value Date    HGB 10.5 (L) 2024       EXAM:  Blood pressure 106/73, pulse 70, temperature 97.8  F (36.6  C), temperature source Oral, resp. rate 16, height 1.676 m (5' 6\"), weight 82.6 kg (182 lb), last menstrual period 2023, SpO2 99%, unknown if currently breastfeeding.  General: NAD  Abdomen: Soft, non-tender  Uterine fundus: Firm, non-tender  Extremities: no pain, no edema    DISPOSITION:  Home    CONDITION AT DISCHARGE:  Good/Stable    Discharge Medications: see AVS    DISCHARGE PLAN:   - Follow up with  MD, in 4-6 weeks, unless indicated sooner  - Take medication as prescribed  - Physical activity: As tolerated, no heavy lifting. Pelvic rest.  - Diet:  Regular  - Medication:  Please see MAR  - Warning signs discussed with patient about when to call the clinic/hospital  - All questions and concerns were answered for the patient prior to discharge.     Radha Cheung MD FACOG  [facility]  [phone]      I saw the patient on the date of discharge  Total time spent for discharge on date of discharge: 20 minutes  "

## 2024-08-03 ENCOUNTER — HEALTH MAINTENANCE LETTER (OUTPATIENT)
Age: 39
End: 2024-08-03

## 2024-09-17 ENCOUNTER — LAB REQUISITION (OUTPATIENT)
Dept: LAB | Facility: CLINIC | Age: 39
End: 2024-09-17

## 2024-09-17 DIAGNOSIS — Z12.4 ENCOUNTER FOR SCREENING FOR MALIGNANT NEOPLASM OF CERVIX: ICD-10-CM

## 2024-09-17 PROCEDURE — G0145 SCR C/V CYTO,THINLAYER,RESCR: HCPCS | Performed by: NURSE PRACTITIONER

## 2024-09-17 PROCEDURE — 87624 HPV HI-RISK TYP POOLED RSLT: CPT | Performed by: NURSE PRACTITIONER

## 2024-09-18 LAB
HPV HR 12 DNA CVX QL NAA+PROBE: NEGATIVE
HPV16 DNA CVX QL NAA+PROBE: NEGATIVE
HPV18 DNA CVX QL NAA+PROBE: NEGATIVE
HUMAN PAPILLOMA VIRUS FINAL DIAGNOSIS: NORMAL

## 2024-09-20 LAB
BKR AP ASSOCIATED HPV REPORT: NORMAL
BKR LAB AP GYN ADEQUACY: NORMAL
BKR LAB AP GYN INTERPRETATION: NORMAL
BKR LAB AP LMP: NORMAL
BKR LAB AP PREVIOUS ABNL DX: NORMAL
BKR LAB AP PREVIOUS ABNORMAL: NORMAL
PATH REPORT.COMMENTS IMP SPEC: NORMAL
PATH REPORT.COMMENTS IMP SPEC: NORMAL
PATH REPORT.RELEVANT HX SPEC: NORMAL

## 2025-08-16 ENCOUNTER — HEALTH MAINTENANCE LETTER (OUTPATIENT)
Age: 40
End: 2025-08-16